# Patient Record
Sex: FEMALE | Race: ASIAN | NOT HISPANIC OR LATINO | URBAN - METROPOLITAN AREA
[De-identification: names, ages, dates, MRNs, and addresses within clinical notes are randomized per-mention and may not be internally consistent; named-entity substitution may affect disease eponyms.]

---

## 2020-11-12 ENCOUNTER — OUTPATIENT (OUTPATIENT)
Dept: OUTPATIENT SERVICES | Facility: HOSPITAL | Age: 65
LOS: 1 days | End: 2020-11-12
Payer: COMMERCIAL

## 2020-11-12 DIAGNOSIS — R06.00 DYSPNEA, UNSPECIFIED: ICD-10-CM

## 2020-11-12 PROCEDURE — 93306 TTE W/DOPPLER COMPLETE: CPT | Mod: 26

## 2020-11-12 PROCEDURE — 93306 TTE W/DOPPLER COMPLETE: CPT

## 2022-02-15 PROBLEM — Z00.00 ENCOUNTER FOR PREVENTIVE HEALTH EXAMINATION: Status: ACTIVE | Noted: 2022-02-15

## 2022-05-06 ENCOUNTER — APPOINTMENT (OUTPATIENT)
Dept: GASTROENTEROLOGY | Facility: CLINIC | Age: 67
End: 2022-05-06

## 2022-06-29 ENCOUNTER — APPOINTMENT (OUTPATIENT)
Dept: GASTROENTEROLOGY | Facility: CLINIC | Age: 67
End: 2022-06-29

## 2022-06-29 VITALS
DIASTOLIC BLOOD PRESSURE: 81 MMHG | OXYGEN SATURATION: 100 % | SYSTOLIC BLOOD PRESSURE: 153 MMHG | BODY MASS INDEX: 21.34 KG/M2 | TEMPERATURE: 97.8 F | HEART RATE: 75 BPM | HEIGHT: 61.02 IN | WEIGHT: 113 LBS

## 2022-06-29 DIAGNOSIS — Z85.42 PERSONAL HISTORY OF MALIGNANT NEOPLASM OF OTHER PARTS OF UTERUS: ICD-10-CM

## 2022-06-29 PROCEDURE — 99204 OFFICE O/P NEW MOD 45 MIN: CPT

## 2022-06-29 RX ORDER — OMEPRAZOLE 20 MG/1
20 CAPSULE, DELAYED RELEASE ORAL
Qty: 30 | Refills: 0 | Status: ACTIVE | COMMUNITY
Start: 2022-06-02

## 2022-06-30 LAB
ANION GAP SERPL CALC-SCNC: 12 MMOL/L
BASOPHILS # BLD AUTO: 0.02 K/UL
BASOPHILS NFR BLD AUTO: 0.5 %
BUN SERPL-MCNC: 11 MG/DL
CALCIUM SERPL-MCNC: 9.8 MG/DL
CHLORIDE SERPL-SCNC: 104 MMOL/L
CO2 SERPL-SCNC: 25 MMOL/L
CREAT SERPL-MCNC: 0.5 MG/DL
EGFR: 105 ML/MIN/1.73M2
EOSINOPHIL # BLD AUTO: 0.02 K/UL
EOSINOPHIL NFR BLD AUTO: 0.5 %
GLUCOSE SERPL-MCNC: 115 MG/DL
HCT VFR BLD CALC: 43.1 %
HGB BLD-MCNC: 14.1 G/DL
IMM GRANULOCYTES NFR BLD AUTO: 0.3 %
LYMPHOCYTES # BLD AUTO: 1.17 K/UL
LYMPHOCYTES NFR BLD AUTO: 29.6 %
MAN DIFF?: NORMAL
MCHC RBC-ENTMCNC: 29.6 PG
MCHC RBC-ENTMCNC: 32.7 GM/DL
MCV RBC AUTO: 90.5 FL
MONOCYTES # BLD AUTO: 0.28 K/UL
MONOCYTES NFR BLD AUTO: 7.1 %
NEUTROPHILS # BLD AUTO: 2.45 K/UL
NEUTROPHILS NFR BLD AUTO: 62 %
PLATELET # BLD AUTO: 161 K/UL
POTASSIUM SERPL-SCNC: 4.6 MMOL/L
RBC # BLD: 4.76 M/UL
RBC # FLD: 12 %
SODIUM SERPL-SCNC: 142 MMOL/L
WBC # FLD AUTO: 3.95 K/UL

## 2022-07-01 NOTE — ASSESSMENT
[FreeTextEntry1] : 65 yo female with an antral mass see on EGD\par \par - Will check an EGD-EUS at [facility]\par - D/w pt regarding need for COVID swab for the procedure as well as escort post-procedure\par - Risks of the procedure including bleeding, perforation, etc d/w the patient\par - NPO after midnight\par

## 2022-07-01 NOTE — HISTORY OF PRESENT ILLNESS
[FreeTextEntry1] : Keena, #[id number] -- Mandarin\par \par 67 yo female with an antral mass see on EGD.  No abdominal pain.  No N/V/D, no constipation.  No BRBPR, no dark stools.  No weight loss, no change in appetite.  \par \par 11/22/21 EGD showed reflux esophagitis, 2 cm multilobulated antral mass, gastritis (bx --> mild foveolar hyperplasia and reactive gastropathy).\par \par 1/7/22 CT A/P showed no gastric lesion, subcm hepatic cyst, s/p hysterectomy abd fluid collection in lower pelvis\par \par No famhx of stomach, colon or pancreatic cancer.  No IBD.  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

## 2022-07-06 DIAGNOSIS — Z01.812 ENCOUNTER FOR PREPROCEDURAL LABORATORY EXAMINATION: ICD-10-CM

## 2022-08-04 LAB — SARS-COV-2 N GENE NPH QL NAA+PROBE: NOT DETECTED

## 2022-08-08 ENCOUNTER — OUTPATIENT (OUTPATIENT)
Dept: OUTPATIENT SERVICES | Facility: HOSPITAL | Age: 67
LOS: 1 days | Discharge: HOME | End: 2022-08-08
Payer: COMMERCIAL

## 2022-08-08 ENCOUNTER — APPOINTMENT (OUTPATIENT)
Dept: GASTROENTEROLOGY | Facility: HOSPITAL | Age: 67
End: 2022-08-08

## 2022-08-08 ENCOUNTER — RESULT REVIEW (OUTPATIENT)
Age: 67
End: 2022-08-08

## 2022-08-08 PROCEDURE — 43237 ENDOSCOPIC US EXAM ESOPH: CPT | Mod: GC

## 2022-08-08 PROCEDURE — 43239 EGD BIOPSY SINGLE/MULTIPLE: CPT | Mod: GC

## 2022-08-08 PROCEDURE — C1889: CPT

## 2022-08-08 PROCEDURE — 43239 EGD BIOPSY SINGLE/MULTIPLE: CPT

## 2022-08-08 PROCEDURE — 88341 IMHCHEM/IMCYTCHM EA ADD ANTB: CPT

## 2022-08-08 PROCEDURE — 88341 IMHCHEM/IMCYTCHM EA ADD ANTB: CPT | Mod: 26

## 2022-08-08 PROCEDURE — 88305 TISSUE EXAM BY PATHOLOGIST: CPT | Mod: 26

## 2022-08-08 PROCEDURE — 43237 ENDOSCOPIC US EXAM ESOPH: CPT

## 2022-08-08 PROCEDURE — 88305 TISSUE EXAM BY PATHOLOGIST: CPT

## 2022-08-08 PROCEDURE — 88342 IMHCHEM/IMCYTCHM 1ST ANTB: CPT | Mod: 26

## 2022-08-08 DEVICE — CLIP RESOLUTION 360 ULTRA 235CM 20/BX: Type: IMPLANTABLE DEVICE | Status: FUNCTIONAL

## 2022-08-08 RX ORDER — OMEPRAZOLE 40 MG/1
40 CAPSULE, DELAYED RELEASE ORAL
Qty: 28 | Refills: 0 | Status: ACTIVE | COMMUNITY
Start: 2022-08-08 | End: 1900-01-01

## 2022-08-12 LAB — SURGICAL PATHOLOGY STUDY: SIGNIFICANT CHANGE UP

## 2022-12-14 ENCOUNTER — APPOINTMENT (OUTPATIENT)
Dept: GASTROENTEROLOGY | Facility: CLINIC | Age: 67
End: 2022-12-14

## 2022-12-14 VITALS
OXYGEN SATURATION: 98 % | HEIGHT: 61 IN | WEIGHT: 115 LBS | HEART RATE: 75 BPM | SYSTOLIC BLOOD PRESSURE: 143 MMHG | RESPIRATION RATE: 14 BRPM | DIASTOLIC BLOOD PRESSURE: 74 MMHG | BODY MASS INDEX: 21.71 KG/M2 | TEMPERATURE: 98.3 F

## 2022-12-14 DIAGNOSIS — K31.89 OTHER DISEASES OF STOMACH AND DUODENUM: ICD-10-CM

## 2022-12-14 DIAGNOSIS — D13.1 BENIGN NEOPLASM OF STOMACH: ICD-10-CM

## 2022-12-14 PROCEDURE — 99214 OFFICE O/P EST MOD 30 MIN: CPT

## 2022-12-14 NOTE — HISTORY OF PRESENT ILLNESS
[FreeTextEntry1] : Cedric, #[id number]\par \par 66 yo female here for f/u after EUS of a subepithelial gastric mass.  No abdominal pain.  No N/V/D, no constipation. No BRBPR, no dark stools.  No weight loss, no change in appetite.  Put on 3-4 lbs of weight.  No BRBPR, no dark stools.  \par \par 1/7/22 CT A/P showed no gastric lesion, sub-cm hepatic cyst, s/p hysterectomy and fluid collection in lower pelvis\par \par No famhx of stomach, colon or pancreatic cancer. No IBD. \par \par Home Attendant  [de-identified] : 8/8/22\par On gastric antrum, anterior wall a submucosal nodularity was noted, jumbo biopsied; on EUS was a hypoechoic thickening of the muscularis mucosa and submucosal layer (bx --> inflammatory fibroid polyp).

## 2022-12-14 NOTE — ASSESSMENT
[FreeTextEntry1] : 68 yo female here for f/u after EUS of a subepithelial gastric mass -- inflammatory fibroid polyp\par \par = No endoscopic surveillance required\par - F/u as needed

## 2023-08-17 ENCOUNTER — NON-APPOINTMENT (OUTPATIENT)
Age: 68
End: 2023-08-17

## 2023-08-21 ENCOUNTER — APPOINTMENT (OUTPATIENT)
Dept: GASTROENTEROLOGY | Facility: HOSPITAL | Age: 68
End: 2023-08-21

## (undated) DEVICE — FORCEP RADIAL JAW 4 240CM DISP